# Patient Record
Sex: FEMALE | Race: WHITE | NOT HISPANIC OR LATINO | Employment: OTHER | URBAN - METROPOLITAN AREA
[De-identification: names, ages, dates, MRNs, and addresses within clinical notes are randomized per-mention and may not be internally consistent; named-entity substitution may affect disease eponyms.]

---

## 2021-06-22 ENCOUNTER — VBI (OUTPATIENT)
Dept: ADMINISTRATIVE | Facility: OTHER | Age: 66
End: 2021-06-22

## 2021-06-22 NOTE — TELEPHONE ENCOUNTER
Upon review of the In Basket request we were able to locate, review, and update the patient chart as requested for DEXA Scan, Immunization(s) Influenza, Mammogram and Pap Smear (HPV) aka Cervical Cancer Screening  Any additional questions or concerns should be emailed to the Practice Liaisons via [email or web address] email, please do not reply via In Basket      Thank you  Chito Montez

## 2021-07-12 ENCOUNTER — ANNUAL EXAM (OUTPATIENT)
Dept: OBGYN CLINIC | Facility: CLINIC | Age: 66
End: 2021-07-12
Payer: MEDICARE

## 2021-07-12 VITALS
WEIGHT: 141 LBS | SYSTOLIC BLOOD PRESSURE: 122 MMHG | DIASTOLIC BLOOD PRESSURE: 82 MMHG | HEIGHT: 62 IN | BODY MASS INDEX: 25.95 KG/M2

## 2021-07-12 DIAGNOSIS — Z12.11 COLON CANCER SCREENING: Primary | ICD-10-CM

## 2021-07-12 DIAGNOSIS — R93.7 ABNORMAL BONE DENSITY SCREENING: ICD-10-CM

## 2021-07-12 DIAGNOSIS — M81.0 OSTEOPOROSIS OF LUMBAR SPINE: ICD-10-CM

## 2021-07-12 DIAGNOSIS — Z12.31 ENCOUNTER FOR SCREENING MAMMOGRAM FOR MALIGNANT NEOPLASM OF BREAST: ICD-10-CM

## 2021-07-12 DIAGNOSIS — Z01.419 ENCOUNTER FOR GYNECOLOGICAL EXAMINATION WITHOUT ABNORMAL FINDING: ICD-10-CM

## 2021-07-12 DIAGNOSIS — E28.39 ESTROGEN DEFICIENCY: ICD-10-CM

## 2021-07-12 DIAGNOSIS — Z78.0 POST-MENOPAUSAL: ICD-10-CM

## 2021-07-12 PROCEDURE — G0101 CA SCREEN;PELVIC/BREAST EXAM: HCPCS | Performed by: OBSTETRICS & GYNECOLOGY

## 2021-07-12 RX ORDER — ZOLEDRONIC ACID 5 MG/100ML
5 INJECTION, SOLUTION INTRAVENOUS ONCE
COMMUNITY

## 2021-07-12 NOTE — PATIENT INSTRUCTIONS
Calcium 1200-1500mg + 600-1000 IU Vit D daily  Exercise 150 minutes per week minimum including weight bearing exercises  Pap with high risk HPV Q 3-5 years  Annual mammogram and monthly breast self exam recommended  Colonoscopy-  encouraged  Order given for cologuard        Kegels 20 times twice daily  Silicone based lubricant with sex  Vaginal moisturizers twice weekly as needed

## 2021-07-12 NOTE — PROGRESS NOTES
[address], [facility], [address] [facility]    ASSESSMENT/PLAN: Anthony Dixon is a 77 y o  [id number] who presents for annual gynecologic exam     Encounter for routine gynecologic examination  - Routine well woman exam completed today  - Cervical Cancer Screening: Current ASCCP Guidelines reviewed  Last Pap: 12/11/2018   Next Pap Due: none  Due to  Post age  70 w no hx :   - Breast Cancer Screening: Last Mammogram 02/17/2020, pt is  ue   -Dexa  Scan  Pt is due  Last  + Osteoporosis   On  Reclast      Additional problems addressed during this visit:  1  Encounter for screening mammogram for malignant neoplasm of breast  -     Mammo screening bilateral w 3d & cad; Future    2  Encounter for gynecological examination without abnormal finding    3  Post-menopausal  -     DXA bone density spine hip and pelvis; Future; Expected date: 01/12/2022    4  BMI 26 0-26 9,adult    5  Osteoporosis of lumbar spine  -     DXA bone density spine hip and pelvis; Future; Expected date: 01/12/2022    6  Abnormal bone density screening  -     DXA bone density spine hip and pelvis; Future; Expected date: 01/12/2022    7  Estrogen deficiency  -     DXA bone density spine hip and pelvis; Future; Expected date: 01/12/2022    8  Colon cancer screening    76 yo here for  Wellness exam   No bleeding, bloating or satiety  No hx of fx   + needs  dexa scan  Order given   + needs  Colonoscopy  And  Has order for cologuard  CC:  Annual Gynecologic Examination    HPI: Anthony Dixon is a 77 y o  [id number] who presents for annual gynecologic examination  Denies  Bleeding, bloating and satiety     + exercise  +   Harder  bm  With  Some  Discomfort   Off and on    + hx of Osteoporosis   No hx of  Fx   since ,menopause   + Reclast   Order given for  Dexa  Scan  And  cologuard       The following portions of the patient's history were reviewed and updated as appropriate: She  has a past medical history of Osteoporosis and Post-menopausal atrophic vaginitis  She  has a past surgical history that includes Colonoscopy (); DXA procedure(historical) (2020);  section ([address]); Breast lumpectomy; Tonsillectomy; Tubal ligation (); and Mammo (historical) (2020)  Her family history includes Breast cancer in her maternal grandmother; Osteoporosis in her mother  She  reports that she has never smoked  She has never used smokeless tobacco  She reports current alcohol use  She reports that she does not use drugs  Current Outpatient Medications   Medication Sig Dispense Refill    B Complex Vitamins (VITAMIN B COMPLEX PO) Take by mouth      Calcium Carb-Cholecalciferol (OSCAL-D) 500 mg-200 units per tablet Take 1 tablet by mouth 2 (two) times a day with meals      zoledronic acid (RECLAST) 5 mg/100 mL IV infusion (premix) Infuse 5 mg into a venous catheter once       No current facility-administered medications for this visit  She has No Known Allergies       Review of Systems   Constitutional: Negative for chills and fever  HENT: Negative for ear pain and sore throat  Eyes: Negative for pain and visual disturbance  Respiratory: Negative for cough and shortness of breath  Cardiovascular: Negative for chest pain and palpitations  Gastrointestinal: Negative for abdominal pain and vomiting  Genitourinary: Negative for dysuria and hematuria  Musculoskeletal: Negative for arthralgias and back pain  Skin: Negative for color change and rash  Neurological: Negative for seizures and syncope  Psychiatric/Behavioral: Negative  All other systems reviewed and are negative  Objective:  /82 (BP Location: Left arm, Patient Position: Sitting, Cuff Size: Standard)   Ht 5' 1 5" (1 562 m)   Wt 64 kg (141 lb)   BMI 26 21 kg/m²    Physical Exam  Vitals and nursing note reviewed  Constitutional:       Appearance: Normal appearance  HENT:      Head: Normocephalic  Cardiovascular:      Rate and Rhythm: Normal rate and regular rhythm  Pulmonary:      Effort: Pulmonary effort is normal       Breath sounds: Normal breath sounds  Chest:      Chest wall: No mass, lacerations, swelling, tenderness or edema  Breasts: Pablo Score is 4  Breasts are symmetrical          Right: Normal  No swelling, bleeding, inverted nipple, mass, nipple discharge, skin change or tenderness  Left: No swelling, bleeding, inverted nipple, mass, nipple discharge, skin change or tenderness  Abdominal:      General: Abdomen is flat  Bowel sounds are normal       Palpations: Abdomen is soft  Genitourinary:     General: Normal vulva  Exam position: Lithotomy position  Pubic Area: No rash  Pablo stage (genital): 4       Labia:         Right: No rash, tenderness or lesion  Left: No rash, tenderness or lesion  Urethra: No urethral pain, urethral swelling or urethral lesion  Vagina: Normal       Cervix: Normal       Uterus: Normal        Adnexa: Right adnexa normal and left adnexa normal       Rectum: Normal       Comments: Atrophy noted  No dryness   Musculoskeletal:         General: Normal range of motion  Cervical back: Neck supple  Lymphadenopathy:      Upper Body:      Right upper body: No supraclavicular, axillary or pectoral adenopathy  Left upper body: No supraclavicular, axillary or pectoral adenopathy  Lower Body: No right inguinal adenopathy  No left inguinal adenopathy  Skin:     General: Skin is warm and dry  Neurological:      General: No focal deficit present  Mental Status: She is alert and oriented to person, place, and time     Psychiatric:         Mood and Affect: Mood normal          Behavior: Behavior normal

## 2021-11-01 ENCOUNTER — TELEPHONE (OUTPATIENT)
Dept: GASTROENTEROLOGY | Facility: CLINIC | Age: 66
End: 2021-11-01

## 2021-11-09 ENCOUNTER — TELEPHONE (OUTPATIENT)
Dept: GASTROENTEROLOGY | Facility: CLINIC | Age: 66
End: 2021-11-09

## 2021-11-16 ENCOUNTER — ANESTHESIA (OUTPATIENT)
Dept: GASTROENTEROLOGY | Facility: AMBULATORY SURGERY CENTER | Age: 66
End: 2021-11-16

## 2021-11-16 ENCOUNTER — HOSPITAL ENCOUNTER (OUTPATIENT)
Dept: GASTROENTEROLOGY | Facility: AMBULATORY SURGERY CENTER | Age: 66
Discharge: HOME | End: 2021-11-16
Payer: MEDICARE

## 2021-11-16 ENCOUNTER — ANESTHESIA EVENT (OUTPATIENT)
Dept: GASTROENTEROLOGY | Facility: AMBULATORY SURGERY CENTER | Age: 66
End: 2021-11-16

## 2021-11-16 VITALS
HEART RATE: 83 BPM | RESPIRATION RATE: 15 BRPM | DIASTOLIC BLOOD PRESSURE: 69 MMHG | SYSTOLIC BLOOD PRESSURE: 115 MMHG | TEMPERATURE: 98.1 F | OXYGEN SATURATION: 96 %

## 2021-11-16 DIAGNOSIS — Z86.010 HISTORY OF COLON POLYPS: ICD-10-CM

## 2021-11-16 PROCEDURE — G0105 COLORECTAL SCRN; HI RISK IND: HCPCS | Performed by: INTERNAL MEDICINE

## 2021-11-16 RX ORDER — PROPOFOL 10 MG/ML
INJECTION, EMULSION INTRAVENOUS AS NEEDED
Status: DISCONTINUED | OUTPATIENT
Start: 2021-11-16 | End: 2021-11-16

## 2021-11-16 RX ORDER — SODIUM CHLORIDE, SODIUM LACTATE, POTASSIUM CHLORIDE, CALCIUM CHLORIDE 600; 310; 30; 20 MG/100ML; MG/100ML; MG/100ML; MG/100ML
50 INJECTION, SOLUTION INTRAVENOUS CONTINUOUS
Status: DISCONTINUED | OUTPATIENT
Start: 2021-11-16 | End: 2021-11-16

## 2021-11-16 RX ORDER — LIDOCAINE HYDROCHLORIDE 10 MG/ML
INJECTION, SOLUTION EPIDURAL; INFILTRATION; INTRACAUDAL; PERINEURAL AS NEEDED
Status: DISCONTINUED | OUTPATIENT
Start: 2021-11-16 | End: 2021-11-16

## 2021-11-16 RX ADMIN — PROPOFOL 20 MG: 10 INJECTION, EMULSION INTRAVENOUS at 13:22

## 2021-11-16 RX ADMIN — PROPOFOL 20 MG: 10 INJECTION, EMULSION INTRAVENOUS at 13:19

## 2021-11-16 RX ADMIN — PROPOFOL 20 MG: 10 INJECTION, EMULSION INTRAVENOUS at 13:24

## 2021-11-16 RX ADMIN — SODIUM CHLORIDE, SODIUM LACTATE, POTASSIUM CHLORIDE, CALCIUM CHLORIDE 50 ML/HR: 600; 310; 30; 20 INJECTION, SOLUTION INTRAVENOUS at 12:50

## 2021-11-16 RX ADMIN — PROPOFOL 80 MG: 10 INJECTION, EMULSION INTRAVENOUS at 13:13

## 2021-11-16 RX ADMIN — LIDOCAINE HYDROCHLORIDE 50 MG: 10 INJECTION, SOLUTION EPIDURAL; INFILTRATION; INTRACAUDAL; PERINEURAL at 13:13

## 2021-11-16 RX ADMIN — PROPOFOL 50 MG: 10 INJECTION, EMULSION INTRAVENOUS at 13:16

## 2021-11-16 RX ADMIN — PROPOFOL 20 MG: 10 INJECTION, EMULSION INTRAVENOUS at 13:15

## 2022-02-28 ENCOUNTER — TELEPHONE (OUTPATIENT)
Dept: OBGYN CLINIC | Facility: CLINIC | Age: 67
End: 2022-02-28

## 2022-02-28 DIAGNOSIS — M81.0 OSTEOPOROSIS, POSTMENOPAUSAL: Primary | ICD-10-CM

## 2022-02-28 NOTE — TELEPHONE ENCOUNTER
TC to pt   Bone density is stable   + Osteoporosis in   Spine  And  osteopenia in hip  Prior use of Boniva   +  One  inj of  Reclast in  9/14/2020  Would like to  Do another  To call and schedule and will need  Labs prior to infusion

## 2022-03-02 NOTE — TELEPHONE ENCOUNTER
Return call from Olman Eugene, reviewed results and recommendations  She requests lab orders for quest  Orders generated, transmitted to [employer]  Patient understands reclast infusion to be done within 30 days of completing bloodwork

## 2022-03-08 NOTE — TELEPHONE ENCOUNTER
Reclast labs resulted, see labs  Patient returned call to schedule reclast  L/M on home# to return call  V/M on cell phone not set up

## 2022-03-09 NOTE — TELEPHONE ENCOUNTER
[address] returned call, aware vitamin D is still pending  Nurse to contact lab today  Reclast appt scheduled for 03/16/2022 at [address] at  added to schedule  Instructions mailed to patient home address

## 2022-03-10 LAB
1,25(OH)2D SERPL-MCNC: 31 PG/ML (ref 18–72)
1,25(OH)2D2 SERPL-MCNC: <8 PG/ML
1,25(OH)2D3 SERPL-MCNC: 31 PG/ML
CALCIUM SERPL-MCNC: 9.9 MG/DL (ref 8.6–10.4)
CREAT SERPL-MCNC: 0.85 MG/DL (ref 0.5–0.99)
SL AMB EGFR AFRICAN AMERICAN: 82 ML/MIN/1.73M2
SL AMB EGFR NON AFRICAN AMERICAN: 70 ML/MIN/1.73M2

## 2022-03-16 ENCOUNTER — CLINICAL SUPPORT (OUTPATIENT)
Dept: OBGYN CLINIC | Facility: CLINIC | Age: 67
End: 2022-03-16
Payer: MEDICARE

## 2022-03-16 VITALS
SYSTOLIC BLOOD PRESSURE: 128 MMHG | BODY MASS INDEX: 26.13 KG/M2 | HEIGHT: 62 IN | DIASTOLIC BLOOD PRESSURE: 76 MMHG | WEIGHT: 142 LBS

## 2022-03-16 DIAGNOSIS — M81.0 OSTEOPOROSIS, POST-MENOPAUSAL: Primary | ICD-10-CM

## 2022-03-16 PROCEDURE — 96365 THER/PROPH/DIAG IV INF INIT: CPT | Performed by: OBSTETRICS & GYNECOLOGY

## 2022-03-16 RX ORDER — ZOLEDRONIC ACID 5 MG/100ML
5 INJECTION, SOLUTION INTRAVENOUS ONCE
Status: COMPLETED | OUTPATIENT
Start: 2022-03-16 | End: 2022-03-16

## 2022-03-16 RX ADMIN — ZOLEDRONIC ACID 5 MG: 5 INJECTION, SOLUTION INTRAVENOUS at 09:40

## 2022-03-16 NOTE — PROGRESS NOTES
Patient presents for Reclast infusion #2  Denies medical/surgical changes  Feeling well today  Denies side effects with prior infusion  Premedicated with ibuprofen 400mg one hour prior to infusion  IV access obtained right had without difficulty  Flushed with 10cc normal saline pre-post infustion  IV removed at end of infusion  Reviewed increase fluids and continue ibuprofen 2-3 times daily for next 2-3 days  Call office with any questions or concerns  Patient verbalized understanding  Discharged to home

## 2022-09-12 ENCOUNTER — OFFICE VISIT (OUTPATIENT)
Dept: OBGYN CLINIC | Facility: CLINIC | Age: 67
End: 2022-09-12

## 2022-09-12 VITALS
BODY MASS INDEX: 26.17 KG/M2 | HEIGHT: 62 IN | DIASTOLIC BLOOD PRESSURE: 79 MMHG | WEIGHT: 142.2 LBS | SYSTOLIC BLOOD PRESSURE: 120 MMHG

## 2022-09-12 DIAGNOSIS — Z91.89 UNSPECIFIED PERSONAL HISTORY PRESENTING HAZARDS TO HEALTH: Primary | ICD-10-CM

## 2022-09-12 DIAGNOSIS — Z12.31 ENCOUNTER FOR SCREENING MAMMOGRAM FOR MALIGNANT NEOPLASM OF BREAST: ICD-10-CM

## 2022-09-12 DIAGNOSIS — R93.7 ABNORMAL BONE DENSITY SCREENING: ICD-10-CM

## 2022-09-12 DIAGNOSIS — Z78.0 POST-MENOPAUSAL: ICD-10-CM

## 2022-09-12 DIAGNOSIS — E28.39 ESTROGEN DEFICIENCY: ICD-10-CM

## 2022-09-12 DIAGNOSIS — M81.0 OSTEOPOROSIS, POST-MENOPAUSAL: ICD-10-CM

## 2022-09-12 DIAGNOSIS — N95.2 ATROPHIC VAGINITIS: ICD-10-CM

## 2022-09-12 RX ORDER — ESTRADIOL 0.1 MG/G
1 CREAM VAGINAL 3 TIMES WEEKLY
Qty: 42.5 G | Refills: 3 | Status: SHIPPED | OUTPATIENT
Start: 2022-09-12 | End: 2023-09-12

## 2022-09-12 NOTE — PROGRESS NOTES
[address], [address], [facility], [address]    ASSESSMENT/PLAN: Joanna Hayes is a 74 y o  [id number] who presents for one year  Fu     - Cervical Cancer Screening: Current ASCCP Guidelines reviewed  Last Pap: 07/12/2021   Next Pap Due: none  Due to  Post age  70 w no hx :   - Breast Cancer Screening: Last Mammogram 07/26/2021, pt has order     -Dexa  Scan    + Osteoporosis   On  Reclast   Done in 2/2022     Additional problems addressed during this visit:  1  Unspecified personal history presenting hazards to health  Comments:  at risk for fracture of bone     2  Encounter for screening mammogram for malignant neoplasm of breast  -     Mammo screening bilateral w 3d & cad; Future    3  Osteoporosis, post-menopausal    4  Post-menopausal  -     estradiol (ESTRACE VAGINAL) 0 1 mg/g vaginal cream; Insert 1 g into the vagina 3 (three) times a week    5  Abnormal bone density screening    6  Estrogen deficiency  -     estradiol (ESTRACE VAGINAL) 0 1 mg/g vaginal cream; Insert 1 g into the vagina 3 (three) times a week    7  BMI 26 0-26 9,adult    8  Atrophic vaginitis  -     estradiol (ESTRACE VAGINAL) 0 1 mg/g vaginal cream; Insert 1 g into the vagina 3 (three) times a week    75 yo here for  Wellness exam   No bleeding, bloating or satiety  No hx of fx         CC   Vaginal  dryness and   Osteoporosis   Fu   HPI: Joanna Hayes is a 74 y o  [id number] who presents for annual gynecologic examination  Denies  Bleeding, bloating and satiety  + exercise    + hx of Osteoporosis   No hx of  Fx   since ,menopause   + Reclast due in 2/2023  Repeat  dexa in  2/2024   + vaginal dryness         The following portions of the patient's history were reviewed and updated as appropriate: She  has a past medical history of Kidney stone, Osteoporosis (02/22/2022), and Post-menopausal atrophic vaginitis    She  has a past surgical history that includes Colonoscopy (2020); DXA procedure(historical) (2022);  section ([address]); Breast lumpectomy; Tonsillectomy; Tubal ligation (); and Mammo (historical) (2020)  Her family history includes Breast cancer in her maternal grandmother; Osteoporosis in her mother  She  reports that she has never smoked  She has never used smokeless tobacco  She reports previous alcohol use  She reports that she does not use drugs  Current Outpatient Medications   Medication Sig Dispense Refill    B Complex Vitamins (VITAMIN B COMPLEX PO) Take by mouth      Calcium Carb-Cholecalciferol (OSCAL-D) 500 mg-200 units per tablet Take 1 tablet by mouth 2 (two) times a day with meals      estradiol (ESTRACE VAGINAL) 0 1 mg/g vaginal cream Insert 1 g into the vagina 3 (three) times a week 42 5 g 3    zoledronic acid (RECLAST) 5 mg/100 mL IV infusion (premix) Infuse 5 mg into a venous catheter once       No current facility-administered medications for this visit  She has No Known Allergies       Review of Systems   Constitutional: Negative for chills and fever  HENT: Negative for ear pain and sore throat  Eyes: Negative for pain and visual disturbance  Respiratory: Negative for cough and shortness of breath  Cardiovascular: Negative for chest pain and palpitations  Gastrointestinal: Negative for abdominal pain and vomiting  Genitourinary: Negative for decreased urine volume, dysuria, hematuria, pelvic pain, urgency, vaginal bleeding and vaginal discharge  Musculoskeletal: Negative for arthralgias and back pain  Skin: Negative for color change and rash  Neurological: Negative for seizures and syncope  Psychiatric/Behavioral: Negative  All other systems reviewed and are negative  Objective:  /79 (BP Location: Left arm, Patient Position: Sitting, Cuff Size: Standard)   Ht 5' 1 5" (1 562 m)   Wt 64 5 kg (142 lb 3 2 oz)   BMI 26 43 kg/m²    Physical Exam  Vitals and nursing note reviewed     Constitutional: Appearance: Normal appearance  HENT:      Head: Normocephalic  Cardiovascular:      Rate and Rhythm: Normal rate and regular rhythm  Pulses: Normal pulses  Heart sounds: Normal heart sounds  Pulmonary:      Effort: Pulmonary effort is normal       Breath sounds: Normal breath sounds  Chest:      Chest wall: No mass, lacerations, swelling, tenderness or edema  Breasts: Pablo Score is 4  Breasts are symmetrical       Right: Normal  No swelling, bleeding, inverted nipple, mass, nipple discharge, skin change, tenderness, axillary adenopathy or supraclavicular adenopathy  Left: No swelling, bleeding, inverted nipple, mass, nipple discharge, skin change, tenderness, axillary adenopathy or supraclavicular adenopathy  Abdominal:      General: Abdomen is flat  Bowel sounds are normal       Palpations: Abdomen is soft  Genitourinary:     General: Normal vulva  Exam position: Lithotomy position  Pubic Area: No rash  Pablo stage (genital): 4       Labia:         Right: No rash, tenderness or lesion  Left: No rash, tenderness or lesion  Urethra: No urethral pain, urethral swelling or urethral lesion  Vagina: Normal       Cervix: Normal       Uterus: Normal        Adnexa: Right adnexa normal and left adnexa normal       Rectum: Normal       Comments: Atrophy noted  No dryness   Musculoskeletal:         General: Normal range of motion  Cervical back: Neck supple  Lymphadenopathy:      Upper Body:      Right upper body: No supraclavicular, axillary or pectoral adenopathy  Left upper body: No supraclavicular, axillary or pectoral adenopathy  Lower Body: No right inguinal adenopathy  No left inguinal adenopathy  Skin:     General: Skin is warm and dry  Neurological:      General: No focal deficit present  Mental Status: She is alert and oriented to person, place, and time     Psychiatric:         Mood and Affect: Mood normal  Behavior: Behavior normal

## 2022-09-12 NOTE — PATIENT INSTRUCTIONS
Calcium 1200-1500mg + 800-1000 IU Vit D daily unless otherwise directed  Avoid falls  Exercise 150 minutes per week minimum including weight bearing exercises  DEXA scan-         No further paps after age 70 as long as there has been adequate normal paps completed, no history of CHASE 2  or a more severe pap diagnosis in the last 20 years  Annual mammogram and monthly breast self exam recommended   Colonoscopy-           Hao Lav 20 times twice daily  Silicone based lubricant with sex  Vaginal moisturizers twice weekly as needed

## 2022-10-12 PROBLEM — Z12.11 COLON CANCER SCREENING: Status: RESOLVED | Noted: 2021-07-12 | Resolved: 2022-10-12
